# Patient Record
Sex: FEMALE | Race: OTHER | Employment: FULL TIME | URBAN - METROPOLITAN AREA
[De-identification: names, ages, dates, MRNs, and addresses within clinical notes are randomized per-mention and may not be internally consistent; named-entity substitution may affect disease eponyms.]

---

## 2018-01-22 PROBLEM — K82.8 GALLBLADDER SLUDGE: Status: ACTIVE | Noted: 2018-01-22

## 2023-03-08 ENCOUNTER — OFFICE VISIT (OUTPATIENT)
Dept: FAMILY MEDICINE CLINIC | Age: 63
End: 2023-03-08
Payer: COMMERCIAL

## 2023-03-08 ENCOUNTER — HOSPITAL ENCOUNTER (OUTPATIENT)
Dept: NON INVASIVE DIAGNOSTICS | Age: 63
Discharge: HOME | End: 2023-03-08
Payer: COMMERCIAL

## 2023-03-08 VITALS
DIASTOLIC BLOOD PRESSURE: 90 MMHG | WEIGHT: 150 LBS | HEIGHT: 62 IN | BODY MASS INDEX: 27.6 KG/M2 | TEMPERATURE: 98.4 F | SYSTOLIC BLOOD PRESSURE: 133 MMHG | OXYGEN SATURATION: 97 % | HEART RATE: 100 BPM | RESPIRATION RATE: 18 BRPM

## 2023-03-08 DIAGNOSIS — I10 ESSENTIAL HYPERTENSION: ICD-10-CM

## 2023-03-08 DIAGNOSIS — R55 NEAR SYNCOPE: ICD-10-CM

## 2023-03-08 DIAGNOSIS — Z12.31 ENCOUNTER FOR SCREENING MAMMOGRAM FOR MALIGNANT NEOPLASM OF BREAST: ICD-10-CM

## 2023-03-08 DIAGNOSIS — R06.09 DOE (DYSPNEA ON EXERTION): ICD-10-CM

## 2023-03-08 DIAGNOSIS — Z00.00 ROUTINE GENERAL MEDICAL EXAMINATION AT A HEALTH CARE FACILITY: Primary | ICD-10-CM

## 2023-03-08 PROCEDURE — 93005 ELECTROCARDIOGRAM TRACING: CPT

## 2023-03-08 PROCEDURE — 3078F DIAST BP <80 MM HG: CPT | Performed by: FAMILY MEDICINE

## 2023-03-08 PROCEDURE — 99396 PREV VISIT EST AGE 40-64: CPT | Performed by: FAMILY MEDICINE

## 2023-03-08 PROCEDURE — 3074F SYST BP LT 130 MM HG: CPT | Performed by: FAMILY MEDICINE

## 2023-03-08 NOTE — PROGRESS NOTES
Subjective:   Anabel Samuel is a 65 y.o. y.o. female here for her annual routine checkup. She plans to schedule her annual gyn visit soon. Patient's last menstrual period was 2017. Social History: not sexually active. Pertinent past medical hstory: hypertension, migraines, no history of DVT, CAD, DM, liver disease, or smoking. Health Habits/Lifestyle  Occupation:  CNA  Household members:  3, patient, mother, fiance  Last dental appointment:     Last eye exam:  doesn't remember  Last colonoscopy:  10/2021  Uses seatbelts regularly :  yes  Getting regular exercise:  yes  Last mammogram:  2018    Patient Active Problem List    Diagnosis Date Noted    Fatty liver 2018    Gallbladder sludge 2018    Migraine 2011    Essential hypertension 2011    PPD positive 2011     Current Outpatient Medications   Medication Sig Dispense Refill    amLODIPine (NORVASC) 10 mg tablet TAKE 1 TABLET BY MOUTH ONCE DAILY 90 Tablet 0    multivit-min-iron-FA-lutein (Centrum Silver Women) 8 mg iron-400 mcg-300 mcg tab Take  by mouth.        Allergies   Allergen Reactions    Amitiza [Lubiprostone] Other (comments)     Caused her to pass out    Citric Acid Hives and Itching     Past Medical History:   Diagnosis Date    Anemia     Essential hypertension 2011    Fatty liver 2018    Gallbladder sludge 2018    Hair     failling out    High blood pressure     Migraines     Unspecified adverse effect of anesthesia     slow to wake up     Past Surgical History:   Procedure Laterality Date    HX BARTHOLIN CYST MARSUPIALIZATION Left 2013    HX  SECTION      x 1    HX HYSTERECTOMY  2017    HX ORTHOPAEDIC      right shoulder with hardware    HX POLYPECTOMY  14    D & C; [facility]; Dr. Luis A Morales    HX [address]     Family History   Problem Relation Age of Onset    Hypertension Mother     Elevated Lipids Mother     Heart Disease Maternal Aunt     Stroke Brother     Heart Disease Brother 28        he needed a heart transplant    Malignant Hyperthermia Neg Hx     Pseudocholinesterase Deficiency Neg Hx     Delayed Awakening Neg Hx     Post-op Nausea/Vomiting Neg Hx     Emergence Delirium Neg Hx     Post-op Cognitive Dysfunction Neg Hx     Other Neg Hx     Breast Cancer Neg Hx      Social History     Tobacco Use    Smoking status: Never    Smokeless tobacco: Never   Substance Use Topics    Alcohol use: No        ROS:  Feeling well except for near syncope. POLLOCK. No chest pain on exertion. No abdominal pain, change in bowel habits, black or bloody stools. No urinary tract symptoms. GYN ROS: no menses, no abnormal bleeding, pelvic pain or discharge, no breast pain or new or enlarging lumps on self exam. No neurological complaints. Objective:  Visit Vitals  BP (!) 133/90   Pulse 100   Temp 98.4 °F (36.9 °C)   Resp 18   Ht 5' 2\" (1.575 m)   Wt 150 lb (68 kg)   LMP 04/04/2017   SpO2 97%   BMI 27.44 kg/m²   She is not orthostatic by pulse or BP    The patient appears well, alert, oriented x 3, in no distress. ENT normal.  Neck supple. No adenopathy or thyromegaly. MIKA. Lungs are clear, good air entry, no wheezes, rhonchi or rales. S1 and S2 normal, no murmurs, regular rate and rhythm. Abdomen soft without tenderness, guarding, mass or organomegaly. Extremities show no edema, normal peripheral pulses. Neurological is normal, no focal findings. BREAST EXAM: deferred to gyn    PELVIC EXAM: deferred to gyn    Assessment/Plan:    ICD-10-CM ICD-9-CM    1. Routine general medical examination at a health care facility  Z00.00 V70.0       2. Essential hypertension  I10 401.9       3.  Near syncope  R55 780.2 EKG, 12 LEAD, INITIAL      TSH 3RD GENERATION      CBC WITH AUTOMATED DIFF      METABOLIC PANEL, COMPREHENSIVE      CANCELED: METABOLIC PANEL, COMPREHENSIVE      CANCELED: CBC WITH AUTOMATED DIFF      CANCELED: TSH 3RD GENERATION      4. POLLOCK (dyspnea on exertion)  R06.09 786.09 EKG, 12 LEAD, INITIAL      CBC WITH AUTOMATED DIFF      CANCELED: CBC WITH AUTOMATED DIFF      5. Encounter for screening mammogram for malignant neoplasm of breast  Z12.31 V76.12 [facility]CTOR MANUEL 3D ALEC W MAMMO BI SCREENING INCL CAD            Eye exam  Dental exam  Labs per orders. EKG  Mammogram     Follow-up and Dispositions    Return in about 1 week (around 3/15/2023) for near syncope, POLLOCK, blood pressure. .      Reviewed plan of care. Patient has provided input and agrees with goals.

## 2023-03-09 LAB
ATRIAL RATE: 73 BPM
CALCULATED P AXIS, ECG09: 52 DEGREES
CALCULATED R AXIS, ECG10: -26 DEGREES
CALCULATED T AXIS, ECG11: 8 DEGREES
DIAGNOSIS, 93000: NORMAL
P-R INTERVAL, ECG05: 200 MS
Q-T INTERVAL, ECG07: 394 MS
QRS DURATION, ECG06: 86 MS
QTC CALCULATION (BEZET), ECG08: 434 MS
VENTRICULAR RATE, ECG03: 73 BPM

## 2023-03-17 ENCOUNTER — VIRTUAL VISIT (OUTPATIENT)
Dept: FAMILY MEDICINE CLINIC | Age: 63
End: 2023-03-17
Payer: COMMERCIAL

## 2023-03-17 DIAGNOSIS — I10 ESSENTIAL HYPERTENSION: Primary | ICD-10-CM

## 2023-03-17 DIAGNOSIS — R94.31 ABNORMAL EKG: ICD-10-CM

## 2023-03-17 PROCEDURE — 99213 OFFICE O/P EST LOW 20 MIN: CPT | Performed by: FAMILY MEDICINE

## 2023-03-17 RX ORDER — AMLODIPINE BESYLATE 10 MG/1
TABLET ORAL
Qty: 90 TABLET | Refills: 4 | Status: SHIPPED | OUTPATIENT
Start: 2023-03-17

## 2023-03-17 NOTE — PROGRESS NOTES
Raad Woodall is a 65 y.o. female who was seen by synchronous (real-time) audio-video technology on 3/17/2023. Assessment & Plan:   Diagnoses and all orders for this visit:    1. Essential hypertension  -     amLODIPine (NORVASC) 10 mg tablet; TAKE 1 TABLET BY MOUTH ONCE DAILY    2. Abnormal EKG  -     REFERRAL TO CARDIOLOGY      Blood pressure controlled  Continue current plans. Refills per orders  Cardiology referral    Follow-up and Dispositions    Return in about 6 months (around 9/17/2023) for blood pressure. Reviewed plan of care. Patient has provided input and agrees with goals. CPT Codes 04120-29089 for Established Patients may apply to this Telehealth Visit      Subjective:   Raad Woodall was seen for Hypertension (Follow up - pt has been keeping a log ) and Results (Discuss EKG results )      Patient presents with:  Hypertension: Follow up - pt has been keeping a log   Results: Discuss EKG results     Her home blood pressures have been 121/81, 112/71, 109/75, 117/83, 118/88, 107/66, 138/97, 114/75 and 135/82. Her EKG read:    Normal sinus rhythm   Possible Left atrial enlargement   Septal infarct , age undetermined   Abnormal ECG         Review of Systems   Eyes:  Negative for blurred vision. Respiratory:  Negative for shortness of breath. Cardiovascular:  Negative for chest pain. Neurological:  Negative for dizziness, sensory change, speech change, focal weakness and headaches. Objective:   /81    Physical Exam  Constitutional:       General: She is not in acute distress. Appearance: Normal appearance. She is not diaphoretic. Neurological:      Mental Status: She is alert and oriented to person, place, and time. Due to this being a TeleHealth evaluation, many elements of the physical examination are unable to be assessed. We discussed the expected course, resolution and complications of the diagnosis(es) in detail.   Medication risks, benefits, costs, interactions, and alternatives were discussed as indicated. I advised her to contact the office if her condition worsens, changes or fails to improve as anticipated. She expressed understanding with the diagnosis(es) and plan. Pursuant to the emergency declaration under the [facility], [facility] waiver authority and the [email or web address] and [employer] Act, this Virtual  Visit was conducted, with patient's consent, to reduce the patient's risk of exposure to COVID-19 and provide continuity of care for an established patient. Services were provided through a video synchronous discussion virtually to substitute for in-person clinic visit.     Bijan Hill MD

## 2023-03-17 NOTE — PROGRESS NOTES
Chief Complaint   Patient presents with    Hypertension     Follow up - pt has been keeping a log     Results     Discuss EKG results

## 2023-03-21 ENCOUNTER — TELEPHONE (OUTPATIENT)
Dept: FAMILY MEDICINE CLINIC | Age: 63
End: 2023-03-21

## 2023-03-24 ENCOUNTER — OFFICE VISIT (OUTPATIENT)
Dept: CARDIOLOGY CLINIC | Age: 63
End: 2023-03-24

## 2023-03-24 VITALS
HEIGHT: 62 IN | HEART RATE: 89 BPM | WEIGHT: 151 LBS | SYSTOLIC BLOOD PRESSURE: 130 MMHG | OXYGEN SATURATION: 97 % | DIASTOLIC BLOOD PRESSURE: 88 MMHG | BODY MASS INDEX: 27.79 KG/M2

## 2023-03-24 DIAGNOSIS — R06.09 DOE (DYSPNEA ON EXERTION): ICD-10-CM

## 2023-03-24 DIAGNOSIS — R55 NEAR SYNCOPE: Primary | ICD-10-CM

## 2023-03-24 NOTE — PROGRESS NOTES
Mandy Parr is a 65 y.o. female    Vitals:    03/24/23 1508   BP: 130/88   BP 1 Location: Left upper arm   BP Patient Position: Sitting   BP Cuff Size: Adult   Pulse: 89   Height: 5' 2\" (1.575 m)   Weight: 151 lb (68.5 kg)   SpO2: 97%       Chief Complaint   Patient presents with    Abnormal EKG       Chest pain NO  SOB NO  Dizziness YES  Recent hospital visit NO  Refills NO  COVID VACCINE YES  HAD COVID?  YES    FATIGUE

## 2023-03-24 NOTE — PROGRESS NOTES
CARDIOLOGY OFFICE NOTE    Phillip Keller MD, [address], [place], [address] [facility]  Phone [phone]; Fax [phone]  Mobile [phone]   Voice Mail [phone]    Primary care: Krista Duncan MD       ATTENTION:   This medical record was transcribed using an electronic medical records/speech recognition system. Although proofread, it may and can contain electronic, spelling and other errors. Corrections may be executed at a later time. Please feel free to contact us for any clarifications as needed. Nii Ferris is a 65 y.o. female with  referred for hypertension near syncope and dyspnea on exertion          Cardiac risk factors: hypertension, post-menopausal  I have personally obtained the history from the patient. HISTORY OF PRESENTING ILLNESS    Ms./Mr. Nii Ferris  65 y.o. is seen for chest discomfort and EKG that suggest septal MI. Previous EKGs in 2018 had a similar appearance. She does come in with her daughter today    She is having left sided chest pain since last year. Sxs worsened since last year. Last night episode of chest pain describes pain. Difficulty breathing. Duration is duration is minutes. She has noctural chest pain. She will note presyncopal sxs.      She was a CNA     ACTIVE PROBLEM LIST     Patient Active Problem List    Diagnosis Date Noted    Fatty liver 01/23/2018    Gallbladder sludge 01/22/2018    Migraine 06/28/2011    Essential hypertension 06/28/2011    PPD positive 06/28/2011           PAST MEDICAL HISTORY     Past Medical History:   Diagnosis Date    Anemia     Essential hypertension 6/28/2011    Fatty liver 1/23/2018    Gallbladder sludge 1/22/2018    Hair     failling out    High blood pressure     Migraines     Unspecified adverse effect of anesthesia     slow to wake up           PAST SURGICAL HISTORY     Past Surgical History:   Procedure Laterality Date    HX BARTHOLIN CYST MARSUPIALIZATION Left 2013    HX  SECTION      x 1    HX HYSTERECTOMY  2017    HX ORTHOPAEDIC      right shoulder with hardware    HX POLYPECTOMY  14    D & C; [facility]; Dr. Luiz Dejesus    HX Kathrene Severs [Lubiprostone] Other (comments)     Caused her to pass out    Citric Acid Hives and Itching          FAMILY HISTORY     Family History   Problem Relation Age of Onset    Hypertension Mother     Elevated Lipids Mother     Heart Disease Maternal Aunt     Stroke Brother     Heart Disease Brother 28        he needed a heart transplant    Malignant Hyperthermia Neg Hx     Pseudocholinesterase Deficiency Neg Hx     Delayed Awakening Neg Hx     Post-op Nausea/Vomiting Neg Hx     Emergence Delirium Neg Hx     Post-op Cognitive Dysfunction Neg Hx     Other Neg Hx     Breast Cancer Neg Hx     negative for cardiac disease       SOCIAL HISTORY     Social History     Socioeconomic History    Marital status:    Tobacco Use    Smoking status: Never    Smokeless tobacco: Never   Vaping Use    Vaping Use: Never used   Substance and Sexual Activity    Alcohol use: No    Drug use: No    Sexual activity: Yes     Partners: Male     Birth control/protection: Surgical         MEDICATIONS     Current Outpatient Medications   Medication Sig    amLODIPine (NORVASC) 10 mg tablet TAKE 1 TABLET BY MOUTH ONCE DAILY    multivit-min-iron-FA-lutein (Centrum Silver Women) 8 mg iron-400 mcg-300 mcg tab Take  by mouth. No current facility-administered medications for this visit. I have reviewed the nurses notes, vitals, problem list, allergy list, medical history, family, social history and medications. REVIEW OF SYMPTOMS    As per HPI  General: Pt denies excessive weight gain or loss. Pt is able to conduct ADL's  HEENT: Denies blurred vision, headaches, hearing loss, epistaxis and difficulty swallowing.   Respiratory: Denies cough, congestion, shortness of breath, POLLOCK, wheezing or stridor. Cardiovascular: Denies precordial pain, palpitations, edema or PND  Gastrointestinal: Denies poor appetite, indigestion, abdominal pain or blood in stool  Genitourinary: Denies hematuria, dysuria, increased urinary frequency  Musculoskeletal: Denies joint pain or swelling from muscles or joints  Neurologic: Denies tremor, paresthesias, headache, or sensory motor disturbance  Psychiatric: Denies confusion, insomnia, depression  Integumentray: Denies rash, itching or ulcers. Hematologic: Denies easy bruising, bleeding     PHYSICAL EXAMINATION      Vitals:    03/24/23 1508   BP: 130/88   Pulse: 89   SpO2: 97%   Weight: 151 lb (68.5 kg)   Height: 5' 2\" (1.575 m)     General: Well developed, in no acute distress. HEENT: No jaundice, oral mucosa moist, no oral ulcers  Neck: Supple, no stiffness, no lymphadenopathy, supple  Heart:  Normal S1/S2 negative S3 or S4. Regular, no murmur, gallop or rub, no jugular venous distention  Respiratory: Clear bilaterally x 4, no wheezing or rales  Extremities:  No edema, normal cap refill, no cyanosis. Musculoskeletal: No clubbing, no deformities  Neuro: A&Ox3, speech clear, gait stable, cooperative, no focal neurologic deficits  Skin: Skin color is normal. No rashes or lesions. Non diaphoretic, moist.  Vascular: 2+ pulses symmetric in all extremities  Abdomen:   Soft, non-tender, bowel sounds are active. EKG: Date: (2023) sinus rhythm with ventricular septal infarct     DIAGNOSTIC DATA     1. Lipids  11/21/19- , HDL 82, LDL 58, TG 24    2.  Echo  4/2/18- EF 70%         LABORATORY DATA       Lab Results   Component Value Date/Time    WBC 5.1 03/08/2023 03:45 PM    HGB 14.5 03/08/2023 03:45 PM    HCT 46.2 03/08/2023 03:45 PM    PLATELET 912 36/02/8183 03:45 PM    MCV 92.8 03/08/2023 03:45 PM      Lab Results   Component Value Date/Time    Sodium 143 03/08/2023 03:45 PM    Potassium 4.1 03/08/2023 03:45 PM    Chloride 108 03/08/2023 03:45 PM    CO2 29 03/08/2023 03:45 PM    Anion gap 6 03/08/2023 03:45 PM    Glucose 89 03/08/2023 03:45 PM    BUN 12 03/08/2023 03:45 PM    Creatinine 0.70 03/08/2023 03:45 PM    BUN/Creatinine ratio 17 03/08/2023 03:45 PM    GFR est  04/29/2021 01:54 PM    GFR est non-AA 96 04/29/2021 01:54 PM    Calcium 9.1 03/08/2023 03:45 PM    Bilirubin, total 0.5 03/08/2023 03:45 PM    Alk. phosphatase 58 03/08/2023 03:45 PM    Protein, total 7.6 03/08/2023 03:45 PM    Albumin 4.5 03/08/2023 03:45 PM    Globulin 3.1 03/08/2023 03:45 PM    A-G Ratio 1.5 03/08/2023 03:45 PM    ALT (SGPT) 40 03/08/2023 03:45 PM            ICD-10-CM ICD-9-CM   1. Near syncope  R55 780.2   2. POLLOCK (dyspnea on exertion)  R06.09 786.09        ASSESSMENT/RECOMMENDATIONS:.      1.  Dyspnea on exertion/chest discomfort  -We will go forward with a stress echo  -Check echo for structural heart disease  -Asked for her to take a baby aspirin 81 mg daily  -Given information on calcium scoring  2. Hypertension  -Blood pressures are pretty good her diastolic is always slightly elevated but today it was around 88  -We will recheck her blood pressures at the time of her stress test  3. Preyncope  -We will do blood testing  4. No recent cholesterol profile but her cholesterols been normal in the past  -We will order cholesterol profile    Follow-up with me in 6 to 8 weeks. Orders Placed This Encounter    LIPID PANEL     Standing Status:   Future     Standing Expiration Date:   3/24/2024    HEPATIC FUNCTION PANEL     Standing Status:   Future     Standing Expiration Date:   3/24/2024       We discussed the expected course, resolution and complications of the diagnosis(es) in detail. Medication risks, benefits, costs, interactions, and alternatives were discussed as indicated. I advised him to contact the office if his condition worsens, changes or fails to improve as anticipated.  He expressed understanding with the diagnosis(es) and plan Follow-up and Dispositions    Return in about 8 weeks (around 5/19/2023). I have discussed the diagnosis with  Bev Sierra and the intended plan as seen in the above orders. Questions were answered concerning future plans. I have discussed medication side effects and warnings with the patient as well. Thank you,  Claudia Mendoza MD for involving me in the care of  Bev Sierra. Please do not hesitate to contact me for further questions/concerns. Phillip Velazquez MD, [address] [place] [address] Rd[address]      [facility], [address] [place] [address]      [phone] / [phone] Fax

## 2023-03-24 NOTE — LETTER
3/24/2023    Patient: Rheba Osgood   YOB: 1960   Date of Visit: 3/24/2023     [facility] [address] [facility] [address]    Dear Linda Arevalo MD,      Thank you for referring Ms. Alejandra Silveira to [place] [address]. for evaluation. My notes for this consultation are attached. If you have questions, please do not hesitate to call me. I look forward to following your patient along with you.       Sincerely,    Yamilka Vargas MD

## 2023-04-18 ENCOUNTER — HOSPITAL ENCOUNTER (OUTPATIENT)
Dept: MAMMOGRAPHY | Age: 63
Discharge: HOME | End: 2023-04-18
Attending: FAMILY MEDICINE
Payer: MEDICAID

## 2023-04-18 DIAGNOSIS — Z12.31 ENCOUNTER FOR SCREENING MAMMOGRAM FOR MALIGNANT NEOPLASM OF BREAST: ICD-10-CM

## 2023-04-18 PROCEDURE — 77063 BREAST TOMOSYNTHESIS BI: CPT

## 2023-05-02 DIAGNOSIS — R55 NEAR SYNCOPE: Primary | ICD-10-CM

## 2023-05-02 DIAGNOSIS — I10 ESSENTIAL HYPERTENSION: ICD-10-CM

## 2023-05-03 ENCOUNTER — ANCILLARY PROCEDURE (OUTPATIENT)
Dept: CARDIOLOGY CLINIC | Age: 63
End: 2023-05-03
Payer: COMMERCIAL

## 2023-05-03 VITALS
HEIGHT: 62 IN | WEIGHT: 151 LBS | SYSTOLIC BLOOD PRESSURE: 128 MMHG | BODY MASS INDEX: 27.79 KG/M2 | DIASTOLIC BLOOD PRESSURE: 84 MMHG

## 2023-05-03 DIAGNOSIS — R55 NEAR SYNCOPE: ICD-10-CM

## 2023-05-03 DIAGNOSIS — R06.09 DOE (DYSPNEA ON EXERTION): ICD-10-CM

## 2023-05-03 LAB
ECHO AO ASC DIAM: 3.2 CM
ECHO AO ASCENDING AORTA INDEX: 1.88 CM/M2
ECHO AO ROOT DIAM: 3.3 CM
ECHO AO ROOT INDEX: 1.94 CM/M2
ECHO AV MEAN GRADIENT: 2 MMHG
ECHO AV MEAN VELOCITY: 0.7 M/S
ECHO AV PEAK GRADIENT: 4 MMHG
ECHO AV PEAK VELOCITY: 1 M/S
ECHO AV VELOCITY RATIO: 1
ECHO AV VTI: 17.8 CM
ECHO LA DIAMETER INDEX: 2.06 CM/M2
ECHO LA DIAMETER: 3.5 CM
ECHO LA TO AORTIC ROOT RATIO: 1.06
ECHO LA VOL 2C: 32 ML (ref 22–52)
ECHO LA VOL 4C: 21 ML (ref 22–52)
ECHO LA VOL BP: 26 ML (ref 22–52)
ECHO LA VOL/BSA BIPLANE: 15 ML/M2 (ref 16–34)
ECHO LA VOLUME AREA LENGTH: 28 ML
ECHO LA VOLUME INDEX A2C: 19 ML/M2 (ref 16–34)
ECHO LA VOLUME INDEX A4C: 12 ML/M2 (ref 16–34)
ECHO LA VOLUME INDEX AREA LENGTH: 16 ML/M2 (ref 16–34)
ECHO LV E' LATERAL VELOCITY: 6 CM/S
ECHO LV EDV A2C: 48 ML
ECHO LV EDV A4C: 51 ML
ECHO LV EDV BP: 51 ML (ref 56–104)
ECHO LV EDV INDEX A4C: 30 ML/M2
ECHO LV EDV INDEX BP: 30 ML/M2
ECHO LV EDV NDEX A2C: 28 ML/M2
ECHO LV EJECTION FRACTION A2C: 56 %
ECHO LV EJECTION FRACTION A4C: 65 %
ECHO LV EJECTION FRACTION BIPLANE: 62 % (ref 55–100)
ECHO LV ESV A2C: 21 ML
ECHO LV ESV A4C: 18 ML
ECHO LV ESV BP: 20 ML (ref 19–49)
ECHO LV ESV INDEX A2C: 12 ML/M2
ECHO LV ESV INDEX A4C: 11 ML/M2
ECHO LV ESV INDEX BP: 12 ML/M2
ECHO LV FRACTIONAL SHORTENING: 34 % (ref 28–44)
ECHO LV INTERNAL DIMENSION DIASTOLE INDEX: 2.59 CM/M2
ECHO LV INTERNAL DIMENSION DIASTOLIC: 4.4 CM (ref 3.9–5.3)
ECHO LV INTERNAL DIMENSION SYSTOLIC INDEX: 1.71 CM/M2
ECHO LV INTERNAL DIMENSION SYSTOLIC: 2.9 CM
ECHO LV IVSD: 1 CM (ref 0.6–0.9)
ECHO LV MASS 2D: 137.8 G (ref 67–162)
ECHO LV MASS INDEX 2D: 81 G/M2 (ref 43–95)
ECHO LV POSTERIOR WALL DIASTOLIC: 0.9 CM (ref 0.6–0.9)
ECHO LV RELATIVE WALL THICKNESS RATIO: 0.41
ECHO LVOT AV VTI INDEX: 1
ECHO LVOT MEAN GRADIENT: 2 MMHG
ECHO LVOT PEAK GRADIENT: 4 MMHG
ECHO LVOT PEAK VELOCITY: 1 M/S
ECHO LVOT VTI: 17.8 CM
ECHO MV A VELOCITY: 0.86 M/S
ECHO MV AREA PHT: 3.8 CM2
ECHO MV E DECELERATION TIME (DT): 201.8 MS
ECHO MV E VELOCITY: 0.42 M/S
ECHO MV E/A RATIO: 0.49
ECHO MV E/E' LATERAL: 7
ECHO MV PRESSURE HALF TIME (PHT): 58.5 MS
ECHO RV FREE WALL PEAK S': 14 CM/S
ECHO RV TAPSE: 1.8 CM (ref 1.7–?)

## 2023-05-03 PROCEDURE — 93306 TTE W/DOPPLER COMPLETE: CPT | Performed by: SPECIALIST
